# Patient Record
Sex: MALE | Race: WHITE
[De-identification: names, ages, dates, MRNs, and addresses within clinical notes are randomized per-mention and may not be internally consistent; named-entity substitution may affect disease eponyms.]

---

## 2018-10-10 ENCOUNTER — HOSPITAL ENCOUNTER (INPATIENT)
Age: 23
LOS: 2 days | Discharge: HOME | DRG: 272 | End: 2018-10-12

## 2018-10-10 ENCOUNTER — HOSPITAL ENCOUNTER (INPATIENT)
Dept: HOSPITAL 91 - FTE | Age: 23
LOS: 2 days | Discharge: HOME | DRG: 272 | End: 2018-10-12
Payer: MEDICAID

## 2018-10-10 DIAGNOSIS — I82.621: Primary | ICD-10-CM

## 2018-10-10 LAB
ADD MAN DIFF?: NO
ADD UMIC: NO
ALANINE AMINOTRANSFERASE: 19 IU/L (ref 13–69)
ALBUMIN/GLOBULIN RATIO: 0.97
ALBUMIN: 3.6 G/DL (ref 3.3–4.9)
ALKALINE PHOSPHATASE: 88 IU/L (ref 42–121)
ANION GAP: 13 (ref 8–16)
ASPARTATE AMINO TRANSFERASE: 19 IU/L (ref 15–46)
BASOPHIL #: 0 10^3/UL (ref 0–0.1)
BASOPHILS %: 0.1 % (ref 0–2)
BILIRUBIN,DIRECT: 0 MG/DL (ref 0–0.2)
BILIRUBIN,TOTAL: 0.7 MG/DL (ref 0.2–1.3)
BLOOD UREA NITROGEN: 11 MG/DL (ref 7–20)
CALCIUM: 9.8 MG/DL (ref 8.4–10.2)
CARBON DIOXIDE: 26 MMOL/L (ref 21–31)
CHLORIDE: 104 MMOL/L (ref 97–110)
CREATININE: 0.9 MG/DL (ref 0.61–1.24)
EOSINOPHILS #: 0 10^3/UL (ref 0–0.5)
EOSINOPHILS %: 0.2 % (ref 0–7)
ETHANOL: < 10 MG/DL
FREE T4 (FREE THYROXINE): 1.16 NG/DL (ref 0.79–2.35)
GLOBULIN: 3.7 G/DL (ref 1.3–3.2)
GLUCOSE: 102 MG/DL (ref 70–220)
HEMATOCRIT: 45.4 % (ref 42–52)
HEMOGLOBIN: 15.1 G/DL (ref 14–18)
IMMATURE GRANS #M: 0.02 10^3/UL (ref 0–0.03)
IMMATURE GRANS % (M): 0.2 % (ref 0–0.43)
INR: 0.93
LYMPHOCYTES #: 1.3 10^3/UL (ref 0.8–2.9)
LYMPHOCYTES %: 14.1 % (ref 15–51)
MEAN CORPUSCULAR HEMOGLOBIN: 29.6 PG (ref 29–33)
MEAN CORPUSCULAR HGB CONC: 33.3 G/DL (ref 32–37)
MEAN CORPUSCULAR VOLUME: 89 FL (ref 82–101)
MEAN PLATELET VOLUME: 9.2 FL (ref 7.4–10.4)
MONOCYTE #: 1.2 10^3/UL (ref 0.3–0.9)
MONOCYTES %: 13.3 % (ref 0–11)
NEUTROPHIL #: 6.7 10^3/UL (ref 1.6–7.5)
NEUTROPHILS %: 72.1 % (ref 39–77)
NUCLEATED RED BLOOD CELLS #: 0 10^3/UL (ref 0–0)
NUCLEATED RED BLOOD CELLS%: 0 /100WBC (ref 0–0)
PARTIAL THROMBOPLASTIN TIME: 28.9 SEC (ref 23–35)
PLATELET COUNT: 284 10^3/UL (ref 140–415)
POTASSIUM: 4 MMOL/L (ref 3.5–5.1)
PROTIME: 12.5 SEC (ref 11.9–14.9)
PT RATIO: 1
RED BLOOD COUNT: 5.1 10^6/UL (ref 4.7–6.1)
RED CELL DISTRIBUTION WIDTH: 11.8 % (ref 11.5–14.5)
SODIUM: 139 MMOL/L (ref 135–144)
TOTAL PROTEIN: 7.3 G/DL (ref 6.1–8.1)
UR ASCORBIC ACID: NEGATIVE MG/DL
UR BILIRUBIN (DIP): NEGATIVE MG/DL
UR BLOOD (DIP): NEGATIVE MG/DL
UR CLARITY: CLEAR
UR COLOR: YELLOW
UR GLUCOSE (DIP): NEGATIVE MG/DL
UR KETONES (DIP): NEGATIVE MG/DL
UR LEUKOCYTE ESTERASE (DIP): NEGATIVE LEU/UL
UR NITRITE (DIP): NEGATIVE MG/DL
UR PH (DIP): 6 (ref 5–9)
UR SPECIFIC GRAVITY (DIP): 1.02 (ref 1–1.03)
UR TOTAL PROTEIN (DIP): NEGATIVE MG/DL
UR UROBILINOGEN (DIP): NEGATIVE MG/DL
WHITE BLOOD COUNT: 9.3 10^3/UL (ref 4.8–10.8)

## 2018-10-10 PROCEDURE — 85610 PROTHROMBIN TIME: CPT

## 2018-10-10 PROCEDURE — 80307 DRUG TEST PRSMV CHEM ANLYZR: CPT

## 2018-10-10 PROCEDURE — 85025 COMPLETE CBC W/AUTO DIFF WBC: CPT

## 2018-10-10 PROCEDURE — 37248 TRLUML BALO ANGIOP 1ST VEIN: CPT

## 2018-10-10 PROCEDURE — 83735 ASSAY OF MAGNESIUM: CPT

## 2018-10-10 PROCEDURE — 83036 HEMOGLOBIN GLYCOSYLATED A1C: CPT

## 2018-10-10 PROCEDURE — 85730 THROMBOPLASTIN TIME PARTIAL: CPT

## 2018-10-10 PROCEDURE — 84443 ASSAY THYROID STIM HORMONE: CPT

## 2018-10-10 PROCEDURE — 36005 INJECTION EXT VENOGRAPHY: CPT

## 2018-10-10 PROCEDURE — 96372 THER/PROPH/DIAG INJ SC/IM: CPT

## 2018-10-10 PROCEDURE — 75820 VEIN X-RAY ARM/LEG: CPT

## 2018-10-10 PROCEDURE — 99285 EMERGENCY DEPT VISIT HI MDM: CPT

## 2018-10-10 PROCEDURE — 96374 THER/PROPH/DIAG INJ IV PUSH: CPT

## 2018-10-10 PROCEDURE — 84439 ASSAY OF FREE THYROXINE: CPT

## 2018-10-10 PROCEDURE — 80053 COMPREHEN METABOLIC PANEL: CPT

## 2018-10-10 PROCEDURE — 84100 ASSAY OF PHOSPHORUS: CPT

## 2018-10-10 PROCEDURE — 93971 EXTREMITY STUDY: CPT

## 2018-10-10 PROCEDURE — 81003 URINALYSIS AUTO W/O SCOPE: CPT

## 2018-10-10 PROCEDURE — 80048 BASIC METABOLIC PNL TOTAL CA: CPT

## 2018-10-10 PROCEDURE — 80061 LIPID PANEL: CPT

## 2018-10-10 PROCEDURE — 37249 TRLUML BALO ANGIOP ADDL VEIN: CPT

## 2018-10-10 RX ADMIN — ENOXAPARIN SODIUM 1 MG: 100 INJECTION SUBCUTANEOUS at 11:38

## 2018-10-10 RX ADMIN — BACITRACIN ZINC AND POLYMYXIN B SULFATE 1 APPLIC: 500; 10000 OINTMENT TOPICAL at 11:07

## 2018-10-10 RX ADMIN — ENOXAPARIN SODIUM 1 MG: 100 INJECTION SUBCUTANEOUS at 13:40

## 2018-10-10 RX ADMIN — MORPHINE SULFATE 1 MG: 2 INJECTION, SOLUTION INTRAMUSCULAR; INTRAVENOUS at 16:10

## 2018-10-10 RX ADMIN — MORPHINE SULFATE 1 MG: 2 INJECTION, SOLUTION INTRAMUSCULAR; INTRAVENOUS at 21:01

## 2018-10-10 RX ADMIN — ENOXAPARIN SODIUM 1 MG: 100 INJECTION SUBCUTANEOUS at 21:27

## 2018-10-11 LAB
ABNORMAL IP MESSAGE: 1
ABNORMAL IP MESSAGE: 1
ADD MAN DIFF?: NO
ADD MAN DIFF?: NO
ANION GAP: 13 (ref 8–16)
BASOPHIL #: 0 10^3/UL (ref 0–0.1)
BASOPHIL #: 0 10^3/UL (ref 0–0.1)
BASOPHILS %: 0.2 % (ref 0–2)
BASOPHILS %: 0.2 % (ref 0–2)
BLOOD UREA NITROGEN: 15 MG/DL (ref 7–20)
CALCIUM: 9.2 MG/DL (ref 8.4–10.2)
CARBON DIOXIDE: 26 MMOL/L (ref 21–31)
CHLORIDE: 103 MMOL/L (ref 97–110)
CHOL/HDL RATIO: 4.6 RATIO
CHOLESTEROL: 178 MG/DL (ref 100–200)
CREATININE: 1 MG/DL (ref 0.61–1.24)
EOSINOPHILS #: 0 10^3/UL (ref 0–0.5)
EOSINOPHILS #: 0 10^3/UL (ref 0–0.5)
EOSINOPHILS %: 0.2 % (ref 0–7)
EOSINOPHILS %: 0.2 % (ref 0–7)
GLUCOSE: 114 MG/DL (ref 70–220)
HDL CHOLESTEROL: 38 MG/DL (ref 30–63)
HEMATOCRIT: 39.9 % (ref 42–52)
HEMATOCRIT: 40.9 % (ref 42–52)
HEMOGLOBIN A1C: 5 % (ref 0–5.9)
HEMOGLOBIN: 13.5 G/DL (ref 14–18)
HEMOGLOBIN: 13.7 G/DL (ref 14–18)
IMMATURE GRANS #M: 0.03 10^3/UL (ref 0–0.03)
IMMATURE GRANS #M: 0.05 10^3/UL (ref 0–0.03)
IMMATURE GRANS % (M): 0.3 % (ref 0–0.43)
IMMATURE GRANS % (M): 0.4 % (ref 0–0.43)
INR: 1.14
LDL CHOLESTEROL,CALCULATED: 129 MG/DL
LYMPHOCYTES #: 1.5 10^3/UL (ref 0.8–2.9)
LYMPHOCYTES #: 1.7 10^3/UL (ref 0.8–2.9)
LYMPHOCYTES %: 12.4 % (ref 15–51)
LYMPHOCYTES %: 15.9 % (ref 15–51)
MAGNESIUM: 2 MG/DL (ref 1.7–2.5)
MEAN CORPUSCULAR HEMOGLOBIN: 29.5 PG (ref 29–33)
MEAN CORPUSCULAR HEMOGLOBIN: 31.1 PG (ref 29–33)
MEAN CORPUSCULAR HGB CONC: 33 G/DL (ref 32–37)
MEAN CORPUSCULAR HGB CONC: 34.3 G/DL (ref 32–37)
MEAN CORPUSCULAR VOLUME: 89.5 FL (ref 82–101)
MEAN CORPUSCULAR VOLUME: 90.5 FL (ref 82–101)
MEAN PLATELET VOLUME: 9.5 FL (ref 7.4–10.4)
MEAN PLATELET VOLUME: 9.6 FL (ref 7.4–10.4)
MONOCYTE #: 1.5 10^3/UL (ref 0.3–0.9)
MONOCYTE #: 1.9 10^3/UL (ref 0.3–0.9)
MONOCYTES %: 12.8 % (ref 0–11)
MONOCYTES %: 17.8 % (ref 0–11)
NEUTROPHIL #: 7 10^3/UL (ref 1.6–7.5)
NEUTROPHIL #: 8.9 10^3/UL (ref 1.6–7.5)
NEUTROPHILS %: 65.6 % (ref 39–77)
NEUTROPHILS %: 74 % (ref 39–77)
NUCLEATED RED BLOOD CELLS #: 0 10^3/UL (ref 0–0)
NUCLEATED RED BLOOD CELLS #: 0 10^3/UL (ref 0–0)
NUCLEATED RED BLOOD CELLS%: 0 /100WBC (ref 0–0)
NUCLEATED RED BLOOD CELLS%: 0 /100WBC (ref 0–0)
PARTIAL THROMBOPLASTIN TIME: 58.7 SEC (ref 23–35)
PARTIAL THROMBOPLASTIN TIME: 92 SEC (ref 23–35)
PHOSPHORUS: 3.6 MG/DL (ref 2.5–4.9)
PLATELET COUNT: 213 10^3/UL (ref 140–415)
PLATELET COUNT: 277 10^3/UL (ref 140–415)
POSITIVE DIFF: (no result)
POSITIVE DIFF: (no result)
POTASSIUM: 3.9 MMOL/L (ref 3.5–5.1)
PROTIME: 14.8 SEC (ref 11.9–14.9)
PT RATIO: 1.2
RED BLOOD COUNT: 4.41 10^6/UL (ref 4.7–6.1)
RED BLOOD COUNT: 4.57 10^6/UL (ref 4.7–6.1)
RED CELL DISTRIBUTION WIDTH: 11.7 % (ref 11.5–14.5)
RED CELL DISTRIBUTION WIDTH: 11.8 % (ref 11.5–14.5)
SODIUM: 138 MMOL/L (ref 135–144)
THYROID STIMULATING HORMONE: 0.76 MIU/L (ref 0.47–4.68)
TRIGLYCERIDES: 57 MG/DL (ref 0–149)
WHITE BLOOD COUNT: 10.6 10^3/UL (ref 4.8–10.8)
WHITE BLOOD COUNT: 12.1 10^3/UL (ref 4.8–10.8)

## 2018-10-11 PROCEDURE — 05CB3ZZ EXTIRPATION OF MATTER FROM RIGHT BASILIC VEIN, PERCUTANEOUS APPROACH: ICD-10-PCS

## 2018-10-11 PROCEDURE — 05773ZZ DILATION OF RIGHT AXILLARY VEIN, PERCUTANEOUS APPROACH: ICD-10-PCS

## 2018-10-11 PROCEDURE — 3E03317 INTRODUCTION OF OTHER THROMBOLYTIC INTO PERIPHERAL VEIN, PERCUTANEOUS APPROACH: ICD-10-PCS

## 2018-10-11 PROCEDURE — 05753ZZ DILATION OF RIGHT SUBCLAVIAN VEIN, PERCUTANEOUS APPROACH: ICD-10-PCS

## 2018-10-11 PROCEDURE — 05C53ZZ EXTIRPATION OF MATTER FROM RIGHT SUBCLAVIAN VEIN, PERCUTANEOUS APPROACH: ICD-10-PCS

## 2018-10-11 PROCEDURE — 05C73ZZ EXTIRPATION OF MATTER FROM RIGHT AXILLARY VEIN, PERCUTANEOUS APPROACH: ICD-10-PCS

## 2018-10-11 RX ADMIN — HEPARIN SODIUM 1 UNIT: 1000 INJECTION, SOLUTION INTRAVENOUS; SUBCUTANEOUS at 15:26

## 2018-10-11 RX ADMIN — ENOXAPARIN SODIUM 1 MG: 100 INJECTION SUBCUTANEOUS at 09:00

## 2018-10-11 RX ADMIN — PYRIDOXINE HYDROCHLORIDE 1 MLS/HR: 100 INJECTION, SOLUTION INTRAMUSCULAR; INTRAVENOUS at 15:55

## 2018-10-11 RX ADMIN — MORPHINE SULFATE 1 MG: 2 INJECTION, SOLUTION INTRAMUSCULAR; INTRAVENOUS at 08:09

## 2018-10-11 RX ADMIN — HEPARIN SODIUM AND DEXTROSE 1 MLS/HR: 10000; 5 INJECTION INTRAVENOUS at 15:34

## 2018-10-11 RX ADMIN — ONDANSETRON HYDROCHLORIDE 1 MG: 2 INJECTION, SOLUTION INTRAMUSCULAR; INTRAVENOUS at 18:34

## 2018-10-11 RX ADMIN — MORPHINE SULFATE 1 MG: 2 INJECTION, SOLUTION INTRAMUSCULAR; INTRAVENOUS at 04:06

## 2018-10-12 LAB
ABNORMAL IP MESSAGE: 1
ADD MAN DIFF?: NO
ANION GAP: 9 (ref 8–16)
BASOPHIL #: 0 10^3/UL (ref 0–0.1)
BASOPHILS %: 0.3 % (ref 0–2)
BLOOD UREA NITROGEN: 13 MG/DL (ref 7–20)
CALCIUM: 9.1 MG/DL (ref 8.4–10.2)
CARBON DIOXIDE: 27 MMOL/L (ref 21–31)
CHLORIDE: 105 MMOL/L (ref 97–110)
CREATININE: 1.04 MG/DL (ref 0.61–1.24)
EOSINOPHILS #: 0.1 10^3/UL (ref 0–0.5)
EOSINOPHILS %: 0.6 % (ref 0–7)
GLUCOSE: 102 MG/DL (ref 70–220)
HEMATOCRIT: 40.4 % (ref 42–52)
HEMOGLOBIN: 13.3 G/DL (ref 14–18)
IMMATURE GRANS #M: 0.05 10^3/UL (ref 0–0.03)
IMMATURE GRANS % (M): 0.5 % (ref 0–0.43)
LYMPHOCYTES #: 1.8 10^3/UL (ref 0.8–2.9)
LYMPHOCYTES %: 16.7 % (ref 15–51)
MEAN CORPUSCULAR HEMOGLOBIN: 29.6 PG (ref 29–33)
MEAN CORPUSCULAR HGB CONC: 32.9 G/DL (ref 32–37)
MEAN CORPUSCULAR VOLUME: 89.8 FL (ref 82–101)
MEAN PLATELET VOLUME: 9.7 FL (ref 7.4–10.4)
MONOCYTE #: 1.7 10^3/UL (ref 0.3–0.9)
MONOCYTES %: 15.9 % (ref 0–11)
NEUTROPHIL #: 7.1 10^3/UL (ref 1.6–7.5)
NEUTROPHILS %: 66 % (ref 39–77)
NUCLEATED RED BLOOD CELLS #: 0 10^3/UL (ref 0–0)
NUCLEATED RED BLOOD CELLS%: 0 /100WBC (ref 0–0)
PARTIAL THROMBOPLASTIN TIME: 81.6 SEC (ref 23–35)
PLATELET COUNT: 289 10^3/UL (ref 140–415)
POSITIVE DIFF: (no result)
POTASSIUM: 3.8 MMOL/L (ref 3.5–5.1)
RED BLOOD COUNT: 4.5 10^6/UL (ref 4.7–6.1)
RED CELL DISTRIBUTION WIDTH: 11.8 % (ref 11.5–14.5)
SODIUM: 137 MMOL/L (ref 135–144)
WHITE BLOOD COUNT: 10.7 10^3/UL (ref 4.8–10.8)

## 2018-10-12 RX ADMIN — HYDROCODONE BITARTRATE AND ACETAMINOPHEN 1 TAB: 5; 325 TABLET ORAL at 14:14

## 2018-10-12 RX ADMIN — HEPARIN SODIUM AND DEXTROSE 1 MLS/HR: 10000; 5 INJECTION INTRAVENOUS at 03:13

## 2019-04-15 ENCOUNTER — HOSPITAL ENCOUNTER (EMERGENCY)
Dept: HOSPITAL 10 - E/R | Age: 24
Discharge: HOME | End: 2019-04-15
Payer: MEDICAID

## 2019-04-15 ENCOUNTER — HOSPITAL ENCOUNTER (EMERGENCY)
Dept: HOSPITAL 91 - E/R | Age: 24
Discharge: HOME | End: 2019-04-15
Payer: COMMERCIAL

## 2019-04-15 VITALS — WEIGHT: 240.3 LBS | BODY MASS INDEX: 47.18 KG/M2 | HEIGHT: 60 IN

## 2019-04-15 VITALS — RESPIRATION RATE: 20 BRPM | DIASTOLIC BLOOD PRESSURE: 75 MMHG | HEART RATE: 75 BPM | SYSTOLIC BLOOD PRESSURE: 95 MMHG

## 2019-04-15 DIAGNOSIS — R07.9: Primary | ICD-10-CM

## 2019-04-15 LAB
ADD MAN DIFF?: NO
ALANINE AMINOTRANSFERASE: 33 IU/L (ref 13–69)
ALBUMIN/GLOBULIN RATIO: 1.24
ALBUMIN: 4.6 G/DL (ref 3.3–4.9)
ALKALINE PHOSPHATASE: 96 IU/L (ref 42–121)
ANION GAP: 12 (ref 5–13)
ASPARTATE AMINO TRANSFERASE: 27 IU/L (ref 15–46)
BASOPHIL #: 0 10^3/UL (ref 0–0.1)
BASOPHILS %: 0.4 % (ref 0–2)
BILIRUBIN,DIRECT: 0 MG/DL (ref 0–0.2)
BILIRUBIN,TOTAL: 0.6 MG/DL (ref 0.2–1.3)
BLOOD UREA NITROGEN: 16 MG/DL (ref 7–20)
CALCIUM: 9.8 MG/DL (ref 8.4–10.2)
CARBON DIOXIDE: 27 MMOL/L (ref 21–31)
CHLORIDE: 101 MMOL/L (ref 97–110)
CK INDEX: 0.5
CK-MB: 0.63 NG/ML (ref 0–2.4)
CREATINE KINASE: 127 IU/L (ref 23–200)
CREATININE: 1.09 MG/DL (ref 0.61–1.24)
EOSINOPHILS #: 0 10^3/UL (ref 0–0.5)
EOSINOPHILS %: 0.7 % (ref 0–7)
GLOBULIN: 3.7 G/DL (ref 1.3–3.2)
GLUCOSE: 96 MG/DL (ref 70–220)
HEMATOCRIT: 44.9 % (ref 42–52)
HEMOGLOBIN: 15.1 G/DL (ref 14–18)
IMMATURE GRANS #M: 0.02 10^3/UL (ref 0–0.03)
IMMATURE GRANS % (M): 0.4 % (ref 0–0.43)
INR: 1
LIPASE: 36 U/L (ref 23–300)
LYMPHOCYTES #: 1.5 10^3/UL (ref 0.8–2.9)
LYMPHOCYTES %: 27.8 % (ref 15–51)
MEAN CORPUSCULAR HEMOGLOBIN: 29.4 PG (ref 29–33)
MEAN CORPUSCULAR HGB CONC: 33.6 G/DL (ref 32–37)
MEAN CORPUSCULAR VOLUME: 87.4 FL (ref 82–101)
MEAN PLATELET VOLUME: 9.4 FL (ref 7.4–10.4)
MONOCYTE #: 0.8 10^3/UL (ref 0.3–0.9)
MONOCYTES %: 14.8 % (ref 0–11)
NEUTROPHIL #: 3.1 10^3/UL (ref 1.6–7.5)
NEUTROPHILS %: 55.9 % (ref 39–77)
NUCLEATED RED BLOOD CELLS #: 0 10^3/UL (ref 0–0)
NUCLEATED RED BLOOD CELLS%: 0 /100WBC (ref 0–0)
PARTIAL THROMBOPLASTIN TIME: 30.3 SEC (ref 23–35)
PLATELET COUNT: 256 10^3/UL (ref 140–415)
POTASSIUM: 4.3 MMOL/L (ref 3.5–5.1)
PROTIME: 13.3 SEC (ref 11.9–14.9)
PT RATIO: 1
RED BLOOD COUNT: 5.14 10^6/UL (ref 4.7–6.1)
RED CELL DISTRIBUTION WIDTH: 12 % (ref 11.5–14.5)
SODIUM: 140 MMOL/L (ref 135–144)
TOTAL PROTEIN: 8.3 G/DL (ref 6.1–8.1)
TROPONIN-I: < 0.012 NG/ML (ref 0–0.12)
WHITE BLOOD COUNT: 5.5 10^3/UL (ref 4.8–10.8)

## 2019-04-15 PROCEDURE — 82550 ASSAY OF CK (CPK): CPT

## 2019-04-15 PROCEDURE — 84484 ASSAY OF TROPONIN QUANT: CPT

## 2019-04-15 PROCEDURE — 83690 ASSAY OF LIPASE: CPT

## 2019-04-15 PROCEDURE — 80053 COMPREHEN METABOLIC PANEL: CPT

## 2019-04-15 PROCEDURE — 71045 X-RAY EXAM CHEST 1 VIEW: CPT

## 2019-04-15 PROCEDURE — 85025 COMPLETE CBC W/AUTO DIFF WBC: CPT

## 2019-04-15 PROCEDURE — 93005 ELECTROCARDIOGRAM TRACING: CPT

## 2019-04-15 PROCEDURE — 99285 EMERGENCY DEPT VISIT HI MDM: CPT

## 2019-04-15 PROCEDURE — 80307 DRUG TEST PRSMV CHEM ANLYZR: CPT

## 2019-04-15 PROCEDURE — 71275 CT ANGIOGRAPHY CHEST: CPT

## 2019-04-15 PROCEDURE — 85610 PROTHROMBIN TIME: CPT

## 2019-04-15 PROCEDURE — 82553 CREATINE MB FRACTION: CPT

## 2019-04-15 PROCEDURE — 85730 THROMBOPLASTIN TIME PARTIAL: CPT

## 2019-04-15 RX ADMIN — SODIUM CHLORIDE 1 ML: 9 INJECTION, SOLUTION INTRAMUSCULAR; INTRAVENOUS; SUBCUTANEOUS at 17:41

## 2019-04-15 RX ADMIN — VASOPRESSIN 1 ML/S: 20 INJECTION, SOLUTION INTRAMUSCULAR; SUBCUTANEOUS at 17:42

## 2019-04-15 RX ADMIN — IOHEXOL 1 ML/S: 350 INJECTION, SOLUTION INTRAVENOUS at 17:42

## 2019-04-15 NOTE — ERD
ER Documentation


Chief Complaint


Chief Complaint





CHEST PAIN AND BACK PAIN FOR THE PAST WEEK.





HPI


The patient is a 23-year-old male, presenting to the ER because of intermittent 


substernal and bilateral chest and upper back pain for the last week, pain is 


worse with movement, denies chest pain with vomiting/radiation/exertion/diapho


resis, dyspnea, abdominal pain, vomiting, dizzy, diarrhea.  He denies any 


history of IV drug abuse, does not smoke nor drink





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 


chronic low back pain


Past surgical history: Thrombectomy of the right axillary and right subclavian 


vein thrombosis





ROS


All systems reviewed and are negative except as per history of present illness.





Medications


Home Meds


Active Scripts


Ibuprofen* (Motrin*) 600 Mg Tab, 600 MG PO Q6H PRN for PAIN, #20 TAB


   Prov:RUTH SHEIKH MD         4/15/19


Reported Medications


Rivaroxaban* (Xarelto*) 20 Mg Tablet, 20 MG PO WITH DINNER, TAB


   4/15/19





Allergies


Allergies:  


Coded Allergies:  


     No Known Allergy (Unverified , 4/15/19)





PMhx/Soc


History of Surgery:  No


Anesthesia Reaction:  No


Hx Neurological Disorder:  No


Hx Respiratory Disorders:  No


Hx Cardiac Disorders:  No


Hx Psychiatric Problems:  No


Hx Miscellaneous Medical Probl:  No


Hx Alcohol Use:  No


Hx Substance Use:  No


Hx Tobacco Use:  No





Physical Exam


Vitals





Vital Signs


  Date      Temp  Pulse  Resp  B/P (MAP)   Pulse Ox  O2          O2 Flow    FiO2


Time                                                 Delivery    Rate


   4/15/19  97.9     75    20  95/75 (82)        99  Room Air


     19:26


   4/15/19  97.9     89    20      115/70        99  Room Air


     18:45                           (85)


   4/15/19  97.9     78    20      149/72        99  Room Air


     18:42                           (97)


   4/15/19  97.9     79    16      108/71        97  Room Air


     17:40                           (83)


   4/15/19  97.9     73    16      115/67        97  Room Air


     16:50                           (83)


   4/15/19  97.9     77    20      118/73        99  Room Air


     15:47                           (88)


   4/15/19  97.9     88    20      124/87        99  Room Air


     15:18                           (99)


   4/15/19  97.9    102    20      133/84        99


     12:36                          (100)





Physical Exam


 Const:      No acute distress.


 Head:        Atraumatic.


 Eyes:       Normal Conjunctiva.


 ENT:         Normal External Ears, Nose and Mouth.


 Neck:        Full range of motion.  No meningismus.


 Resp:         Clear to auscultation bilaterally.


 Cardio:       Regular rate and rhythm.


 Abd:         Soft,  non distended, normal bowel sounds, non tender.


 Skin:         No petechiae or rashes.


 Back:        No midline or flank tenderness.


 Ext:          No cyanosis, or edema.


 Neur:        Awake and alert. No focal deficit


 Psych:        Normal Mood and Affect.


Result Diagram:  


4/15/19 1516                                                                    


           4/15/19 1516





Results 24 hrs





Laboratory Tests


Test
                              4/15/19
15:09   4/15/19
15:16   4/15/19
18:16


Urine Opiates Screen               Negative


Urine Barbiturates                 Negative


Urine Amphetamines Screen          Negative


Urine Benzodiazepines Screen       Negative


Urine Cocaine Screen               Negative


Urine Cannabinoids                 Negative


White Blood Count                                   5.5 10^3/ul


Red Blood Count                                    5.14 10^6/ul


Hemoglobin                                            15.1 g/dl


Hematocrit                                               44.9 %


Mean Corpuscular Volume                                 87.4 fl


Mean Corpuscular Hemoglobin                             29.4 pg


Mean Corpuscular                   
                 33.6 g/dl 
  



Hemoglobin
Concent


Red Cell Distribution Width                              12.0 %


Platelet Count                                      256 10^3/UL


Mean Platelet Volume                                     9.4 fl


Immature Granulocytes %                                 0.400 %


Neutrophils %                                            55.9 %


Lymphocytes %                                            27.8 %


Monocytes %                                              14.8 %


Eosinophils %                                             0.7 %


Basophils %                                               0.4 %


Nucleated Red Blood Cells %                         0.0 /100WBC


Immature Granulocytes #                           0.020 10^3/ul


Neutrophils #                                       3.1 10^3/ul


Lymphocytes #                                       1.5 10^3/ul


Monocytes #                                         0.8 10^3/ul


Eosinophils #                                       0.0 10^3/ul


Basophils #                                         0.0 10^3/ul


Nucleated Red Blood Cells #                         0.0 10^3/ul


Prothrombin Time                                       13.3 Sec


Prothrombin Time Ratio                                      1.0


INR International                  
                      1.00 
  



Normalized
Ratio


Activated Partial
Thromboplast     
                  30.3 Sec 
  



Time


Sodium Level                                         140 mmol/L


Potassium Level                                      4.3 mmol/L


Chloride Level                                       101 mmol/L


Carbon Dioxide Level                                  27 mmol/L


Anion Gap                                                    12


Blood Urea Nitrogen                                    16 mg/dl


Creatinine                                           1.09 mg/dl


Est Glomerular Filtrat             
              > 60 mL/min 
   



Rate
mL/min


Glucose Level                                          96 mg/dl


Calcium Level                                         9.8 mg/dl


Total Bilirubin                                       0.6 mg/dl


Direct Bilirubin                                     0.00 mg/dl


Indirect Bilirubin                                    0.6 mg/dl


Aspartate Amino Transf
(AST/SGOT)  
                   27 IU/L 
  



Alanine                            
                   33 IU/L 
  



Aminotransferase
(ALT/SGPT)


Alkaline Phosphatase                                    96 IU/L


Creatine Kinase                                        127 IU/L


Creatine Kinase Index                                       0.5


Creatinine Kinase MB (Mass)                          0.63 ng/ml


Troponin I                                        < 0.012 ng/ml   < 0.012 ng/ml


Total Protein                                          8.3 g/dl


Albumin                                                4.6 g/dl


Globulin                                              3.70 g/dl


Albumin/Globulin Ratio                                     1.24


Lipase                                                   36 U/L





Current Medications


 Medications
   Dose
          Sig/Kodi
       Start Time
   Status  Last


 (Trade)       Ordered        Route
 PRN     Stop Time              Admin
Dose


                              Reason                                Admin


 IV Flush
      10 ml          [facility]K-[facility]        4/15/19       DC           4/15/19


(NS 10 ml)                    ONCE
 .ROUTE
  16:53
                       17:41



                                             4/15/19 16:54


 Sodium         100 ml @ ud    STK-MED        4/15/19       DC           4/15/19


Chloride                      ONCE
 .ROUTE
  16:53
                       17:42



                                             4/15/19 16:54


 Iohexol        100 ml @ ud    [facility]        4/15/19       DC           4/15/19


                              ONCE
 .ROUTE
  16:53
                       17:42



                                             4/15/19 16:54








Procedures/MDM


                          [place] [address]


                        Radiology Main Line: [phone]





                            DIAGNOSTIC IMAGING REPORT





Patient: LIDA JENSEN   : 1995   Age: 23  Sex: M                       





       MR #:    [id number]   Acct #:   [id number]    DOS: 04/15/19 1448


Ordering MD: RUTH SHEIKH MD   Location:  E/R   Room/Bed:                    


                       


                                        


PROCEDURE:   CTA Chest. 


 


CLINICAL INDICATION:   Chest pain and shortness of breath 


 


TECHNIQUE:   Continues 1.25 mm axial images were obtained from lung apices to 


the domes of diaphragms following intravenous injection of 100 cc of Isovue 370.


 Images reconstructed in coronal, sagittal, and 3-D format using maximum 


intensity projection technique.. The calculated dose length product (DLP) = 


691.35 mGy-cm. The CTDlvol = 17.55 mGy.  One or more of the following dose 


reduction techniques were used: Automated exposure control, adjustment of the mA


and or KV according to patient size, or use of iterative reconstruction 


technique.  


 


DICOM images are available.


 


COMPARISON:   No prior studies are available for comparison. 


 


FINDINGS:


Images through the pulmonary arteries demonstrates no evidence of large or 


central pulmonary emboli. Ascending and descending thoracic aorta are normal in 


caliber without aneurysmal dilatation or dissection. There is a bovine branching


anatomy of the great vessels. Visualized portions of the carotids are within n


ormal limits. Heart chambers are normal in size. No significant coronary 


calcification is seen. There is no pericardial effusion. There are no 


pathologically enlarged mediastinal or axillary lymph nodes.


 


Evaluation of the lung fields demonstrates no confluent pneumonia, pleural 


fluid, or pneumothorax. No suspicious or dominant lung nodules/masses are seen.


 


No destructive bony lesions are identified.


 


IMPRESSION:


 


1.  No evidence of large or central pulmonary emboli.


2.  No aortic aneurysm or dissection.


3.  Lungs clear 


 


 


 


RPTAT: 


_____________________________________________ 


.Nik Sky MD MD           Date    Time 


Electronically viewed and signed by .Nik Sky MD, MD on 04/15/2019 17:41 


 


D:  04/15/2019 17:41  T:  04/15/2019 17:41


.W/





CC: RUTH SHEIKH MD





[id number]


                          [place] [address]


                        Radiology Main Line: [phone]





                            DIAGNOSTIC IMAGING REPORT





Patient: LIDA JENSEN   : 1995   Age: 23  Sex: M                       





       MR #:    [id number]   Acct #:   [id number]    DOS: 04/15/19 1448


Ordering MD: RUTH SHEIKH MD   Location:  E/R   Room/Bed:                    


                       


                                        


PROCEDURE:


  XR Chest, 1 View


 


CLINICAL INDICATION:


  Chest pain.


 


TECHNIQUE:


  Frontal view of the chest.


 


COMPARISON:


  None


 


FINDINGS:


  LUNGS:  Unremarkable.  No consolidation.


  PLEURAL SPACE:  Unremarkable.  No pneumothorax.


  HEART:  Unremarkable.  No cardiomegaly.


  MEDIASTINUM:  Unremarkable.


  BONES/JOINTS:  Unremarkable.


 


IMPRESSION:     


 


  No acute cardiopulmonary disease demonstrated.


 


 


 


RPTAT: [facility]


_____________________________________________ 


Lucho Meehan Physician Assistant           Date    Time 


Electronically viewed and signed by Lucho Meehan Physician Assistant on 


04/15/2019 16:23 


 


D:  04/15/2019 16:23  T:  04/15/2019 16:23


C/





CC: RUTH SHEIKH MD





[id number]





EK:42 pm                    Read by emergency physician


Rate/Rhythm:         Sinus tachycardia 106 beats/min


QRS, ST, T-waves:    No ST elevation, no T inversion, MICHELINE


Impression:               Abnormal EKG





EKG:      3:51  pm                    Read by emergency physician


Rate/Rhythm:         NSR 69 beats/min


QRS, ST, T-waves:    No ST elevation, no T inversion, early repolarization


Impression:               Abnormal EKG





MEDICAL MAKING DECISION: The patient is a 23-year-old male, presenting with 


acute chest pain of unclear etiology, is stable for outpatient follow-up





The differential diagnoses considered include but are not limited to acute 


coronary syndrome, acute myocardial infarction, pericarditis, pulmonary 


embolism, aortic dissection, pneumonia, pleural effusion, pneumothorax, GERD, 


chest wall pain.





The patient presents with chest pain and I considered pulmonary embolism, aortic


dissection, pneumothorax among other diagnoses.  Evaluation for acute coronary 


syndrome was performed.  The HEART score (www.mdcalc.com) was utilized for risk 


stratification and found to be <= 3.  Repeat EKG and troponin @ 3 hours were 


unchanged.  Based on this evaluation the patients risk of major adverse cardiac 


events is <1%.  Shared decision making occurred with patient and the decision 


has been made to discharge the patient for outpatient evaluation and functional 


study within 72 hours.





Departure


Diagnosis:  


   Primary Impression:  


   Chest pain


Condition:  Good


Comments


He was discharged with Motrin





The patient's blood pressure was elevated (>120/80) but appears stable without 


evidence of hypertension emergency or urgency.  The patient was counseled about 


the risks of hypertension and urged to pursue outpatient monitoring and therapy 


within a week with their primary care physician.





I discussed the findings with the patient. I advised the patient to follow-up 


with the primary physician in about 2-3 days, sooner if needed and return if any


concern.





Disclaimer: Inadvertent spelling and grammatical errors are likely due to EHR/


dictation software use and do not reflect on the overall quality of patient 


care. Also, please note that the electronic time recorded on this note does not 


necessarily reflect the actual time of the patient encounter.











RUTH SHEIKH MD              Apr 15, 2019 14:38